# Patient Record
Sex: FEMALE | Race: WHITE | URBAN - METROPOLITAN AREA
[De-identification: names, ages, dates, MRNs, and addresses within clinical notes are randomized per-mention and may not be internally consistent; named-entity substitution may affect disease eponyms.]

---

## 2022-08-08 ENCOUNTER — OFFICE VISIT (OUTPATIENT)
Dept: URBAN - METROPOLITAN AREA CLINIC 71 | Facility: CLINIC | Age: 57
End: 2022-08-08
Payer: COMMERCIAL

## 2022-08-08 DIAGNOSIS — H04.123 DRY EYE SYNDROME OF BILATERAL LACRIMAL GLANDS: Primary | ICD-10-CM

## 2022-08-08 PROCEDURE — 99204 OFFICE O/P NEW MOD 45 MIN: CPT | Performed by: OPTOMETRIST

## 2022-08-08 ASSESSMENT — KERATOMETRY
OD: 43.13
OS: 43.38

## 2022-08-08 ASSESSMENT — INTRAOCULAR PRESSURE
OS: 10
OD: 9

## 2022-08-08 NOTE — IMPRESSION/PLAN
Impression: Dry eye syndrome of bilateral lacrimal glands: H04.123. Symptoms worse in the AM. Diagnosed with Sjogren's. Plan: Discussed dry eye disease. Use artificial tears up to QID. Recommend using Genteal Gel for Severe dry eyes or Refresh Celluvisc before bed. Discussed additional treatment options if symptoms do not resolve or if they worsen. Call with concerns.